# Patient Record
Sex: FEMALE | Race: OTHER | NOT HISPANIC OR LATINO
[De-identification: names, ages, dates, MRNs, and addresses within clinical notes are randomized per-mention and may not be internally consistent; named-entity substitution may affect disease eponyms.]

---

## 2018-05-15 ENCOUNTER — APPOINTMENT (OUTPATIENT)
Dept: HEART AND VASCULAR | Facility: CLINIC | Age: 58
End: 2018-05-15
Payer: COMMERCIAL

## 2018-05-15 PROBLEM — Z00.00 ENCOUNTER FOR PREVENTIVE HEALTH EXAMINATION: Status: ACTIVE | Noted: 2018-05-15

## 2018-05-15 PROCEDURE — 99203 OFFICE O/P NEW LOW 30 MIN: CPT

## 2018-06-18 ENCOUNTER — APPOINTMENT (OUTPATIENT)
Dept: HEART AND VASCULAR | Facility: CLINIC | Age: 58
End: 2018-06-18

## 2018-06-18 ENCOUNTER — APPOINTMENT (OUTPATIENT)
Dept: HEART AND VASCULAR | Facility: CLINIC | Age: 58
End: 2018-06-18
Payer: COMMERCIAL

## 2018-06-18 VITALS — HEART RATE: 77 BPM | DIASTOLIC BLOOD PRESSURE: 68 MMHG | SYSTOLIC BLOOD PRESSURE: 104 MMHG

## 2018-06-18 DIAGNOSIS — Z87.891 PERSONAL HISTORY OF NICOTINE DEPENDENCE: ICD-10-CM

## 2018-06-18 DIAGNOSIS — R00.2 PALPITATIONS: ICD-10-CM

## 2018-06-18 DIAGNOSIS — R06.02 SHORTNESS OF BREATH: ICD-10-CM

## 2018-06-18 PROCEDURE — 93351 STRESS TTE COMPLETE: CPT

## 2018-06-18 PROCEDURE — 93325 DOPPLER ECHO COLOR FLOW MAPG: CPT

## 2018-06-18 PROCEDURE — 99214 OFFICE O/P EST MOD 30 MIN: CPT | Mod: 25

## 2018-06-18 PROCEDURE — 93320 DOPPLER ECHO COMPLETE: CPT

## 2019-08-06 ENCOUNTER — APPOINTMENT (OUTPATIENT)
Dept: ORTHOPEDIC SURGERY | Facility: CLINIC | Age: 59
End: 2019-08-06
Payer: COMMERCIAL

## 2019-08-06 VITALS
BODY MASS INDEX: 19.03 KG/M2 | DIASTOLIC BLOOD PRESSURE: 60 MMHG | WEIGHT: 121.25 LBS | HEIGHT: 66.93 IN | SYSTOLIC BLOOD PRESSURE: 110 MMHG

## 2019-08-06 DIAGNOSIS — M76.30 ILIOTIBIAL BAND SYNDROME, UNSPECIFIED LEG: ICD-10-CM

## 2019-08-06 DIAGNOSIS — M47.816 SPONDYLOSIS W/OUT MYELOPATHY OR RADICULOPATHY, LUMBAR REGION: ICD-10-CM

## 2019-08-06 PROCEDURE — 99203 OFFICE O/P NEW LOW 30 MIN: CPT

## 2019-08-06 NOTE — HISTORY OF PRESENT ILLNESS
[de-identified] : Patient presents for evaluation of bilateral hip area pain, left worse than right. Pain began about 2 months ago without precipitating injury. States pain is worse when sleeping and lying on the left side. She notes pain after walking prolonged periods but is not limping and ambulates unassisted. She has tried Diclofenac with moderate relief but tries to use sparingly due to history of GERD. She also complains of chronic low back  pain with occasional radicular pain down the left leg.

## 2019-08-06 NOTE — PHYSICAL EXAM
[de-identified] : X-rays from [facility] demonstrate bilateral hips with normal joint space without evidence of acute fracture or dislocation [de-identified] : Constitutional: Well appearing.  No acute distress.\par Mental Status: Alert & oriented to person, place and time. Normal affect.\par Pulmonary: No respiratory distress. Normal chest excursion.\par Abdomen: Soft and not distended.\par Gait: Normal.\par Ambulatory assist devices: None.\par \par Cervical spine: Skin intact. No visible deformity.  Painless active ROM without evident restriction.\par Bilateral upper extremities: Skin intact. No deformity. Painless active ROM without evident restriction.\par Thoracolumbar spine: No deformity. No tenderness. No radicular pain on passive straight leg raise bilaterally.\par Pelvis: No pelvic obliquity. No tenderness.\par Leg lengths: Equal.\par \par Right Hip:\par Skin intact.\par No surgical scars.\par No erythema.\par No ecchymosis.\par No swelling.\par No deformity.\par No focal tenderness.\par Painless and unrestricted range of motion. \par No crepitation.\par No instability.\par JUNE painless.\par Impingement (FADIR) painless.\par Stinchfield painless.\par Abductor power 5/5.\par Flexor power 5/5.\par \par Left Hip:\par Skin intact.\par No surgical scars.\par No erythema.\par No ecchymosis.\par No swelling.\par No deformity.\par GT/ITB tenderness.\par Unrestricted range of motion. ROM from full extension to 110 degrees of flexion. 20 degrees of internal rotation. 50 degrees of external rotation. 60 degrees of abduction. 20 degrees of adduction, pain with terminal external rotation .\par No crepitation.\par No instability.\par Landy painful\par JUNE painless.\par Impingement (FADIR) painless.\par Stinchfield painless.\par Abductor power 5/5.\par Flexor power 5/5.\par \par Bilateral Knees:  Skin intact. No surgical scars. No erythema or ecchymosis. No swelling or effusion. No deformity. No focal tenderness. Painless and unrestricted range of motion. Central patellar tracking. No crepitation. No instability. Quadriceps power 5/5.\par \par Neurological: Intact distal crude touch sensation. Normal distal motor power. Symmetric knee jerk and ankle jerk reflexes bilaterally.\par Cardiovascular: Palpable dorsalis pedis and posterior tibialis pulses. Brisk capillary refill. No peripheral edema.\par Lymphatics:  No peripheral adenopathy appreciated.\par

## 2019-08-06 NOTE — DISCUSSION/SUMMARY
[de-identified] : Diagnosis, prognosis and treatment options discussed. Conservative management including activity modification, therapeutic exercise with PT, topical and oral antiinflammatories, and steroid injections discussed. Patient will begin PT and will follow up if no improvement. She was given a referral for physiatry for lumbar DJD and radiculopathy.

## 2020-02-12 ENCOUNTER — APPOINTMENT (OUTPATIENT)
Dept: OTOLARYNGOLOGY | Facility: CLINIC | Age: 60
End: 2020-02-12
Payer: COMMERCIAL

## 2020-02-12 VITALS
HEIGHT: 67 IN | TEMPERATURE: 98.6 F | OXYGEN SATURATION: 98 % | DIASTOLIC BLOOD PRESSURE: 86 MMHG | WEIGHT: 121 LBS | SYSTOLIC BLOOD PRESSURE: 135 MMHG | HEART RATE: 74 BPM | BODY MASS INDEX: 18.99 KG/M2

## 2020-02-12 DIAGNOSIS — H61.21 IMPACTED CERUMEN, RIGHT EAR: ICD-10-CM

## 2020-02-12 DIAGNOSIS — R49.0 DYSPHONIA: ICD-10-CM

## 2020-02-12 DIAGNOSIS — R07.0 PAIN IN THROAT: ICD-10-CM

## 2020-02-12 PROCEDURE — 99204 OFFICE O/P NEW MOD 45 MIN: CPT | Mod: 25

## 2020-02-12 PROCEDURE — 31579 LARYNGOSCOPY TELESCOPIC: CPT

## 2020-02-12 PROCEDURE — 69210 REMOVE IMPACTED EAR WAX UNI: CPT

## 2020-02-12 RX ORDER — METHYLPREDNISOLONE 4 MG/1
4 TABLET ORAL
Qty: 21 | Refills: 0 | Status: ACTIVE | COMMUNITY
Start: 2020-02-12 | End: 1900-01-01

## 2020-02-13 PROBLEM — R07.0 THROAT PAIN: Status: ACTIVE | Noted: 2020-02-13

## 2020-02-13 NOTE — REVIEW OF SYSTEMS
[Patient Intake Form Reviewed] : Patient intake form was reviewed [As Noted in HPI] : as noted in HPI [Negative] : Allergies [de-identified] : frequent ear wax

## 2020-02-13 NOTE — HISTORY OF PRESENT ILLNESS
[de-identified] : 59F who presents with 2 weeks of sore throat with associated hoarseness, cough and dysphagia. She reports 2 weeks ago she began having sore throat with cough, hoarseness and dysphagia for which she went to her PCP who prescribed her Amoxicillin and Benzotate with some improvement, but persistence of dysphagia and hoarseness.  She denies any breathing issues and she is able to get adequate nutrition. She admits to eating coffee, chocolate, tomato sauce, citrus regularly and reports she has been told she has acid reflux and takes omeprazole to treat, but does not take daily or consistently.  No ENT issues otherwise.

## 2020-02-13 NOTE — PHYSICAL EXAM
[Normal] : no rashes [de-identified] : R EAC with impacted cerumen, removed, L EAC WNL [de-identified] : voice volume, pitch and range are WNL

## 2020-02-13 NOTE — ASSESSMENT
[FreeTextEntry1] : 59F who presents with persistence of hoarseness and dysphagia after URI. Patient seems to be improving after antibiotic therapy prescribed by PCP and given that the symptoms have only been present for 2 weeks, we recommended supportive care, voice rest, hydration and steroids to decrease inflammation and expedite the recovery.\par \par Plan:\par - Rx Medrol dosepak\par - f/u 4 weeks if persistence of symptoms

## 2020-03-09 ENCOUNTER — TRANSCRIPTION ENCOUNTER (OUTPATIENT)
Age: 60
End: 2020-03-09

## 2020-03-11 ENCOUNTER — APPOINTMENT (OUTPATIENT)
Dept: OTOLARYNGOLOGY | Facility: CLINIC | Age: 60
End: 2020-03-11